# Patient Record
Sex: MALE | Race: WHITE | NOT HISPANIC OR LATINO | Employment: OTHER | URBAN - METROPOLITAN AREA
[De-identification: names, ages, dates, MRNs, and addresses within clinical notes are randomized per-mention and may not be internally consistent; named-entity substitution may affect disease eponyms.]

---

## 2023-11-16 NOTE — PROGRESS NOTES
Physical Therapy  Physical Therapy Treatment    Patient Name: Sanjeev Berumen  MRN: [id number]  Today's Date: 2023  Time Calculation  Start Time: 1045  Stop Time: 1130  Time Calculation (min): 45 min    Referring Physician: Dr. Vázquez  Insurance reviewed   Visit number: 9   [place]ved [place] of visits: [place]   Authorization not required after evaluation   Medicare MMO, no auth, no copay   Medicare Certification Period: Beginnin2023 Ending: 10/ 31/ 2023   Medicare Re-Certification Period: Beginnin2023 Endin2023       Current Problem  1. Chronic right-sided low back pain               Precautions       Pain Score: 0 - No pain  Performing HEP: No      Subjective   Patient reports he got back from [place] last week. He complains of stiffness R LB with numbness B knee to feet. Patient has been doing light exercise. States he didn't use the pool in [place], due to construction.      Objective   Function: 25%  Posture: forward flexed, pronation B  Palpation: no pain to palpation over lumbar spine  Gait: short stride, decreased heel strike/toe off, ambulating with a cane    Treatment:        Nustep x8 min. L1  Passive stretching B hip flexor, piriformis, hamstring, TR  GB heel slide x2'       GB bridge x2'       SAQ R/L alt. X2'       DB#1 x2'       Hip ER supine with red TB x2'       Sit to stand x10  Standing tandem balance R/L x2 ea in // bars    Assessment:   Tight hamstrings with stretching. Good endurance with exercise. Min. Dizziness with transfer from sit to stand       Plan:   Progress land based exercises as tolerated. Patient tends to get sore the day after, so monitor symptoms.         Charges:   2 ex, 1 NME    Almaz Valdes, PT, OCS

## 2023-11-20 ENCOUNTER — TREATMENT (OUTPATIENT)
Dept: PHYSICAL THERAPY | Facility: CLINIC | Age: 73
End: 2023-11-20
Payer: MEDICARE

## 2023-11-20 DIAGNOSIS — M54.50 CHRONIC RIGHT-SIDED LOW BACK PAIN: Primary | ICD-10-CM

## 2023-11-20 DIAGNOSIS — G89.29 CHRONIC RIGHT-SIDED LOW BACK PAIN: Primary | ICD-10-CM

## 2023-11-20 PROCEDURE — 97110 THERAPEUTIC EXERCISES: CPT | Mod: GP

## 2023-11-20 PROCEDURE — 97112 NEUROMUSCULAR REEDUCATION: CPT | Mod: GP

## 2023-11-20 ASSESSMENT — PAIN SCALES - GENERAL: PAINLEVEL_OUTOF10: 0 - NO PAIN

## 2023-11-22 ENCOUNTER — TREATMENT (OUTPATIENT)
Dept: PHYSICAL THERAPY | Facility: CLINIC | Age: 73
End: 2023-11-22
Payer: MEDICARE

## 2023-11-22 DIAGNOSIS — M54.50 CHRONIC RIGHT-SIDED LOW BACK PAIN: Primary | ICD-10-CM

## 2023-11-22 DIAGNOSIS — G89.29 CHRONIC RIGHT-SIDED LOW BACK PAIN: Primary | ICD-10-CM

## 2023-11-22 PROCEDURE — 97112 NEUROMUSCULAR REEDUCATION: CPT | Mod: GP,CQ

## 2023-11-22 PROCEDURE — 97110 THERAPEUTIC EXERCISES: CPT | Mod: GP,CQ

## 2023-11-22 NOTE — PROGRESS NOTES
"Physical Therapy  Physical Therapy Treatment    Patient Name: Sanjeev Berumen  MRN: [id number]  Today's Date: 11/22/2023  Time Calculation  Start Time: 1220  Stop Time: 1305  Time Calculation (min): 45 min    Insurance:  Visit [place]: 10 of [place]  Authorization info: Not required after evaluation  Insurance Type: Medicare MMO  Cert date start: 8/14/23  Cert date end: 10/23/23  Recert date start: 9/28/23   Recert date end: 12/27/23                General:  Reason for visit: Chronic back pain  Referred by: Dr. Vázquez      Current Problem  1. Chronic right-sided low back pain                    Subjective:   Patient reports that he has no pain only numbness down both legs.  HEP Performed:  Partially    Objective:   Function: ambulating in partial trunk flexion with antalgic gait using a standard cane.    Treatments:    Recumbent stepper- 5'   DBE 2x1'   Bosu lunge alternating- 1.5'   //bars tandem stand R/L, L/R- 1.5'x2 ea.   Iso Hip ab/ad (GRTBand/Ball)- 3'   Iso Hip ext 5\"hold/rest- 3'   GB heel slide x2'       GB bridge x2'       Hip ER supine with red TB x2'       Knee-chest- R/L/B- 20\" ea x 3 sets       SS R/L HF, Pirif, Hams, TR- 2' ea.         Charges: TE 2, NME 1 (CQ Modifier)    Assessment:   Did well but seems to be having dizziness from supine to upright.  Possibly vertigo.    Plan:   Continue decreasing lumbar radicular symptoms increase rom, flexibility, mobility, strength and function as patient tolerates.    Sanjeev Mendiola, PTA  "

## 2023-11-25 NOTE — PROGRESS NOTES
Physical Therapy  Physical Therapy Treatment    Patient Name: Sanjeev Berumen  MRN: [id number]  Today's Date: 11/27/2023  Time Calculation  Start Time: 1130  Stop Time: 1215  Time Calculation (min): 45 min    Insurance:  Visit [place]: 11 of [place]  Authorization info: Not required after evaluation  Insurance Type: Medicare MMO  Cert date start: 8/14/23  Cert date end: 10/23/23  Recert date start: 9/28/23   Recert date end: 12/27/23                General:  Reason for visit: Chronic back pain  Referred by: Dr. Vázquez      Current Problem  1. Chronic right-sided low back pain                 Pain Score: 0 - No pain    Subjective:   Patient reports that he has a little more feeling in legs and numbness is slightly improved. Patient has OA R knee, so his knee is a little sore.  HEP Performed:  Partially    Objective:   Gait: amb with cane and steppage gait.    Treatments:     Nustep x5 min. L2  Passive stretching B hip flexor, piriformis, hamstring, TR  GB heel slide x2'       GB bridge x2'       Sit to stand x12       Airex march x2'       Bosu lunge alt. R/L x2'  Standing tandem balance on airex R/L x2 ea in // bars x10 sec ea  Hamstring curl 40# 2x10  Hip ABD/ADD 32.5# 2x10 ea  Leg press 70# 2x10         Charges: TE 2, NME 1   Assessment:   Patient continues to have dizziness from supine to upright.  He states this happens at home as well. Recommend patient contact MD.    Plan:   Continue PT for strength, flexibility, balance and gait    Almaz Valdes, PT

## 2023-11-27 ENCOUNTER — TREATMENT (OUTPATIENT)
Dept: PHYSICAL THERAPY | Facility: CLINIC | Age: 73
End: 2023-11-27
Payer: MEDICARE

## 2023-11-27 DIAGNOSIS — G89.29 CHRONIC RIGHT-SIDED LOW BACK PAIN: Primary | ICD-10-CM

## 2023-11-27 DIAGNOSIS — M54.50 CHRONIC RIGHT-SIDED LOW BACK PAIN: Primary | ICD-10-CM

## 2023-11-27 PROCEDURE — 97112 NEUROMUSCULAR REEDUCATION: CPT | Mod: GP

## 2023-11-27 PROCEDURE — 97110 THERAPEUTIC EXERCISES: CPT | Mod: GP

## 2023-11-27 ASSESSMENT — PAIN SCALES - GENERAL: PAINLEVEL_OUTOF10: 0 - NO PAIN

## 2023-11-29 ENCOUNTER — TREATMENT (OUTPATIENT)
Dept: PHYSICAL THERAPY | Facility: CLINIC | Age: 73
End: 2023-11-29
Payer: MEDICARE

## 2023-11-29 DIAGNOSIS — M54.50 CHRONIC RIGHT-SIDED LOW BACK PAIN: Primary | ICD-10-CM

## 2023-11-29 DIAGNOSIS — G89.29 CHRONIC RIGHT-SIDED LOW BACK PAIN: Primary | ICD-10-CM

## 2023-11-29 PROCEDURE — 97112 NEUROMUSCULAR REEDUCATION: CPT | Mod: GP,CQ

## 2023-11-29 PROCEDURE — 97110 THERAPEUTIC EXERCISES: CPT | Mod: GP,CQ

## 2023-11-29 ASSESSMENT — PAIN SCALES - GENERAL: PAINLEVEL_OUTOF10: 0 - NO PAIN

## 2023-11-29 NOTE — PROGRESS NOTES
"Physical Therapy  Physical Therapy Treatment    Patient Name: Sanjeev Breumen  MRN: [id number]  Today's Date: 11/29/2023  Time Calculation  Start Time: 1220  Stop Time: 1305  Time Calculation (min): 45 min    Insurance:  Visit [place]: 10 of [place]  Authorization info: Not required after evaluation  Insurance Type: Medicare MMO  Cert date start: 8/14/23  Cert date end: 10/23/23  Recert date start: 9/28/23   Recert date end: 12/27/23                General:  Reason for visit: Chronic back pain  Referred by: Dr. Vázquez      Current Problem  1. Chronic right-sided low back pain               Pain Score: 0 - No pain    Subjective:   Patient reports that he has no pain only numbness down both legs.  HEP Performed:  Partially    Objective:   Function: ambulating in partial trunk flexion with antalgic gait using a standard cane.    Treatments:    T5 NuStep- 5'   Bosu lunge alternating- 1.5'   //bars tandem stand R/L, L/R- 1.5'x2 ea.   Hamstring curl 40# 2x12   Leg press 70# 2x12   Hip ab/ad 32.5#/32.5# 2x12 ea.   Iso Hip ext 5\"hold/rest- 3'   GB heel slide x2'       GB bridge x2'       Hip ER supine with red TB x2'       Knee-chest- R/L/B- 20\" ea x 3 sets       SS R/L HF, Pirif, Hams, TR- 2' ea.         Charges: TE 2, NME 1 (CQ Modifier)    Assessment:   Did well but still has some vertigo issues when he goes from supine to sit.    Plan:   Continue decreasing lumbar radicular symptoms increase rom, flexibility, mobility, strength and function as patient tolerates.    Sanjeev Mendiola, PTA  "

## 2023-11-30 NOTE — PROGRESS NOTES
Physical Therapy  Physical Therapy Treatment    Patient Name: Sanjeev Berumen  MRN: [id number]  Today's Date: 12/4/2023  Time Calculation  Start Time: 1130  Stop Time: 1215  Time Calculation (min): 45 min    Insurance:  Visit [place]: 11 of [place]  Authorization info: Not required after evaluation  Insurance Type: Medicare MMO  Cert date start: 8/14/23  Cert date end: 10/23/23  Recert date start: 9/28/23   Recert date end: 12/27/23                General:  Reason for visit: Chronic back pain  Referred by: Dr. Vázquez      Current Problem  1. Chronic right-sided low back pain                      Subjective:   Patient reports that he is tired from a lot of walking yesterday when they picked their [place] tree.  HEP Performed:  Partially    Objective:   Palpable tenderness L hip flexor after having a muscle cramp.    Treatments:    NuStep- 5' L2  Passive stretching supine B hip flexor, hamstring, piriformis, TR  Theragun to L hip flexor  GB bridge x2'   Sit to stand x15  Side step red band 40'x2   // bars Bosu lunge alternating- 2'   //bars tandem stand R/L, L/R- with 2kg KB cw/ccw x10 ea   Hamstring curl 40# 2x15   Leg press 70# 2x15   Hip ab/ad 32.5#/32.5# 2x12 ea.- not today                              Charges: TE 2, NME 1   Assessment:   Good endurance with exercise session today. Patient trying to be more aware of posture with walking.    Plan:   Continue decreasing lumbar radicular symptoms increase rom, flexibility, mobility, strength and function as patient tolerates.    Almaz Valdes, PT

## 2023-12-04 ENCOUNTER — TREATMENT (OUTPATIENT)
Dept: PHYSICAL THERAPY | Facility: CLINIC | Age: 73
End: 2023-12-04
Payer: MEDICARE

## 2023-12-04 DIAGNOSIS — G89.29 CHRONIC RIGHT-SIDED LOW BACK PAIN: Primary | ICD-10-CM

## 2023-12-04 DIAGNOSIS — M54.50 CHRONIC RIGHT-SIDED LOW BACK PAIN: Primary | ICD-10-CM

## 2023-12-04 PROCEDURE — 97110 THERAPEUTIC EXERCISES: CPT | Mod: GP

## 2023-12-04 PROCEDURE — 97112 NEUROMUSCULAR REEDUCATION: CPT | Mod: GP

## 2023-12-06 ENCOUNTER — TREATMENT (OUTPATIENT)
Dept: PHYSICAL THERAPY | Facility: CLINIC | Age: 73
End: 2023-12-06
Payer: MEDICARE

## 2023-12-06 DIAGNOSIS — G89.29 CHRONIC RIGHT-SIDED LOW BACK PAIN: Primary | ICD-10-CM

## 2023-12-06 DIAGNOSIS — M54.50 CHRONIC RIGHT-SIDED LOW BACK PAIN: Primary | ICD-10-CM

## 2023-12-06 PROCEDURE — 97110 THERAPEUTIC EXERCISES: CPT | Mod: GP,CQ

## 2023-12-06 PROCEDURE — 97112 NEUROMUSCULAR REEDUCATION: CPT | Mod: GP,CQ

## 2023-12-06 ASSESSMENT — PAIN SCALES - GENERAL: PAINLEVEL_OUTOF10: 0 - NO PAIN

## 2023-12-06 NOTE — PROGRESS NOTES
"Physical Therapy  Physical Therapy Treatment    Patient Name: Sanjeev Berumen  MRN: [id number]  Today's Date: 12/6/2023  Time Calculation  Start Time: 1220  Stop Time: 1300  Time Calculation (min): 40 min    Insurance:  Visit [place]: 12 of [place]  Authorization info: Not required after evaluation  Insurance Type: Medicare MMO  Cert date start: 8/14/23  Cert date end: 10/23/23  Recert date start: 9/28/23   Recert date end: 12/27/23                General:  Reason for visit: Chronic back pain  Referred by: Dr. Vázquez      Current Problem  1. Chronic right-sided low back pain               Pain Score: 0 - No pain    Subjective:   Patient reports that he has no pain only numbness down both legs.  HEP Performed:  Partially    Objective:   Function: ambulating in partial trunk flexion with antalgic gait using a standard cane.    Treatments:    T5 NuStep- 5'   Bosu lunge alternating- 1.5'   //bars 2kg tandem stand R/L, L/R- cw/ccw- 1' ea dir.   Side step RTBand 40'x2   Hamstring curl 35# 2x15   Leg press 75# 2x10   Hip ab/ad 32.5#/32.5# 2x15 ea.   Sit-stand 2x10   Iso Hip ext 5\"hold/rest- 3'       Hip ER supine with red TB x2'       Knee-chest- R/L/B- 20\" ea x 3 sets (omitted 12/6/23)       SS R/L HF, Pirif, Hams, TR- 2' ea. (Omitted 12/6/23)         Charges: TE 2, NME 1 (CQ Modifier)    Assessment:   Continue having vertigo when he goes from supine to sit.  Otherwise working hard to get back to a functional level of activity.    Plan:   Continue decreasing lumbar radicular symptoms increase rom, flexibility, mobility, strength and function as patient tolerates.    Sanjeev Mendiola, PTA  "

## 2023-12-12 ENCOUNTER — TREATMENT (OUTPATIENT)
Dept: PHYSICAL THERAPY | Facility: CLINIC | Age: 73
End: 2023-12-12
Payer: MEDICARE

## 2023-12-12 DIAGNOSIS — G89.29 CHRONIC RIGHT-SIDED LOW BACK PAIN: Primary | ICD-10-CM

## 2023-12-12 DIAGNOSIS — M54.50 CHRONIC RIGHT-SIDED LOW BACK PAIN: Primary | ICD-10-CM

## 2023-12-12 PROCEDURE — 97112 NEUROMUSCULAR REEDUCATION: CPT | Mod: GP,CQ

## 2023-12-12 PROCEDURE — 97110 THERAPEUTIC EXERCISES: CPT | Mod: GP,CQ

## 2023-12-12 ASSESSMENT — PAIN SCALES - GENERAL: PAINLEVEL_OUTOF10: 3

## 2023-12-12 NOTE — PROGRESS NOTES
"Physical Therapy  Physical Therapy Treatment    Patient Name: Sanjeev Berumen  MRN: [id number]  Today's Date: 12/12/2023  Time Calculation  Start Time: 1215  Stop Time: 1300  Time Calculation (min): 45 min    Insurance:  [place]: [place] of [place]  Authorization info: Not required after evaluation  Insurance Type: Medicare MMO  Cert date start: 8/14/23  Cert date end: 10/23/23  Recert date start: 9/28/23   Recert date end: 12/27/23                General:  Reason for visit: Chronic back pain  Referred by: Dr. Vázquez      Current Problem  1. Chronic right-sided low back pain               Pain Score: 3    Subjective:   Patient states that he has some mild soreness from the last visit.  Just enough to let him know that his back still bothers him.    HEP Performed:  Partially    Objective:   Function: still ambulating in partial trunk flexion using a standard cane.    Treatments:    T5 NuStep- 5'   Bosu lunge alternating- 1.5'   //bars 2kg tandem stand R/L, L/R- cw/ccw- 1' ea dir.   Side step RTBand 40'x2   Hamstring curl 35# 2x15   Leg press 75# 2x10   Hip ab/ad 32.5#/32.5# 2x15 ea.   Sit-stand 2x10   Iso Hip ext 5\"hold/rest- 3'       Knee-chest- R/L/B- 20\" ea x 3 sets        SS R/L HF, Pirif, Hams, TR- 2' ea.          Charges: TE 2, NME 1 (CQ Modifier)    Assessment:   Did a lot better today.  Had him do the static stretches in stand for the Q/HF and sitting for the piriformis.  No vertigo.    Plan:   Continue decreasing lumbar radicular symptoms increase rom, flexibility, mobility, strength and function as patient tolerates.    Sanjeev Mendiola, PTA  "

## 2023-12-14 ENCOUNTER — TREATMENT (OUTPATIENT)
Dept: PHYSICAL THERAPY | Facility: CLINIC | Age: 73
End: 2023-12-14
Payer: MEDICARE

## 2023-12-14 DIAGNOSIS — G89.29 CHRONIC RIGHT-SIDED LOW BACK PAIN: Primary | ICD-10-CM

## 2023-12-14 DIAGNOSIS — M54.50 CHRONIC RIGHT-SIDED LOW BACK PAIN: Primary | ICD-10-CM

## 2023-12-14 PROCEDURE — 97112 NEUROMUSCULAR REEDUCATION: CPT | Mod: GP,CQ

## 2023-12-14 PROCEDURE — 97110 THERAPEUTIC EXERCISES: CPT | Mod: GP,CQ

## 2023-12-14 NOTE — PROGRESS NOTES
"Physical Therapy  Physical Therapy Treatment    Patient Name: Sanjeev Berumen  MRN: [id number]  Today's Date: 12/14/2023  Time Calculation  Start Time: 1220  Stop Time: 1300  Time Calculation (min): 40 min    Insurance:  [place]: [place] of [place]  Authorization info: Not required after evaluation  Insurance Type: Medicare MMO  Cert date start: 8/14/23  Cert date end: 10/23/23  Recert date start: 9/28/23   Recert date end: 12/27/23                General:  Reason for visit: Chronic back pain  Referred by: Dr. Vázquez      Current Problem  1. Chronic right-sided low back pain                    Subjective:   Patient states that he is doing alright and that his numbness must be getting better because he had some mild knee pain.    HEP Performed:  Partially    Objective:   Function: still ambulating in partial trunk flexion using a standard cane.    Treatments:    T5 NuStep- 5'   Bosu lunge alternating- 1.5'   //bars 2kg tandem stand R/L, L/R- cw/ccw- 1' ea dir.   Side step RTBand 40'x2   Hamstring curl 35# 2x16   Leg press 75# 2x12   Hip ab/ad 32.5#/32.5# 2x16 ea.   Sit-stand 2x12   Iso Hip ext 5\"hold/rest- 3'       Knee-chest- R/L/B- 20\" ea x 3 sets        SS R/L HF, Pirif, Hams, TR- 2' ea.          Charges: TE 2, NME 1 (CQ Modifier)    Assessment:   Needs to do his stretches in stand or sit verses supine to avoid getting vertigo.    Plan:   Continue decreasing lumbar radicular symptoms increase rom, flexibility, mobility, strength and function as patient tolerates.    Sanjeev Mendiola, PTA  "

## 2023-12-18 NOTE — PROGRESS NOTES
"Physical Therapy  Physical Therapy Treatment    Patient Name: Sanjeev Berumen  MRN: [id number]  Today's Date: 12/19/2023  Time Calculation  Start Time: 1105  Stop Time: 1150  Time Calculation (min): 45 min    Insurance:  [place]: [place] of [place]  Authorization info: Not required after evaluation  Insurance Type: Medicare MMO  Cert date start: 8/14/23  Cert date end: 10/23/23  Recert date start: 9/28/23   Recert date end: 12/27/23                General:  Reason for visit: Chronic back pain  Referred by: Dr. Vázquez      Current Problem  1. Chronic right-sided low back pain                 Pain Score: 3    Subjective:   Patient states low back and R knee pain today. States numbness is now below his knee, so he can feel the pain.  HEP Performed:  Partially    Objective:   Gait: ambulates slowly with cane. Decreased heel strike and toe off.   Pulse ox: 97%, Heart rate: 69    Treatments:    T5 NuStep- 5'  Standing hamstring stretch, hip flexor stretch, quad stretch(with assist), seated piriformis stretch  Airex march x2'   Bosu lunge alternating- 2'   //bars hurdles: 2 feet, alt. Feet, side step x3 ea with hand hold of bar   Hamstring curl 35# 3x10   Leg press 75# 2x10  Deferred:   Hip ab/ad 32.5#/32.5# 2x16 ea.   Sit-stand 2x12   Iso Hip ext 5\"hold/rest- 3'       Knee-chest- R/L/B- 20\" ea x 3 sets               Charges: TE 2, NME 1     Assessment:   Performed stretched in standing today due to vertigo. Patient very SOB with cris exercises and required increased rest in sitting, however, pulse ox and heart rate were WNL.      Plan:   Patient scheduled for 2 more sessions prior to return to [place] for the winter.    Almaz Valdes, PT  "

## 2023-12-19 ENCOUNTER — TREATMENT (OUTPATIENT)
Dept: PHYSICAL THERAPY | Facility: CLINIC | Age: 73
End: 2023-12-19
Payer: MEDICARE

## 2023-12-19 DIAGNOSIS — M54.50 CHRONIC RIGHT-SIDED LOW BACK PAIN: Primary | ICD-10-CM

## 2023-12-19 DIAGNOSIS — G89.29 CHRONIC RIGHT-SIDED LOW BACK PAIN: Primary | ICD-10-CM

## 2023-12-19 PROCEDURE — 97112 NEUROMUSCULAR REEDUCATION: CPT | Mod: GP

## 2023-12-19 PROCEDURE — 97110 THERAPEUTIC EXERCISES: CPT | Mod: GP

## 2023-12-19 ASSESSMENT — PAIN SCALES - GENERAL: PAINLEVEL_OUTOF10: 3

## 2023-12-21 ENCOUNTER — TREATMENT (OUTPATIENT)
Dept: PHYSICAL THERAPY | Facility: CLINIC | Age: 73
End: 2023-12-21
Payer: MEDICARE

## 2023-12-21 DIAGNOSIS — G89.29 CHRONIC RIGHT-SIDED LOW BACK PAIN: Primary | ICD-10-CM

## 2023-12-21 DIAGNOSIS — M54.50 CHRONIC RIGHT-SIDED LOW BACK PAIN: Primary | ICD-10-CM

## 2023-12-21 PROCEDURE — 97110 THERAPEUTIC EXERCISES: CPT | Mod: GP,CQ

## 2023-12-21 PROCEDURE — 97112 NEUROMUSCULAR REEDUCATION: CPT | Mod: GP,CQ

## 2023-12-21 ASSESSMENT — PAIN - FUNCTIONAL ASSESSMENT: PAIN_FUNCTIONAL_ASSESSMENT: 0-10

## 2023-12-21 ASSESSMENT — PAIN SCALES - GENERAL: PAINLEVEL_OUTOF10: 3

## 2023-12-21 NOTE — PROGRESS NOTES
"Physical Therapy  Physical Therapy Treatment    Patient Name: Sanjeev Berumen  MRN: [id number]  Today's Date: 12/21/2023  Time Calculation  Start Time: 1220    Insurance:  [place]: [place] of [place]  Authorization info: Not required after evaluation  Insurance Type: Medicare MMO  Cert date start: 8/14/23  Cert date end: 10/23/23  Recert date start: 9/28/23   Recert date end: 12/27/23                General:  Reason for visit: Chronic back pain  Referred by: Dr. Vázquez      Current Problem  1. Chronic right-sided low back pain               Pain Assessment: 0-10  Pain Score: 3    Subjective:   Patient states that his right knee pain is a 4 and his back about a 2-3.    HEP Performed:  Partially    Objective:   Function: still ambulating in partial trunk flexion using a standard cane.    Treatments:    T5 NuStep- 5'  DBE 2x1.5'   Bosu lunge alternating- 1.5'   //bars 2kg tandem stand R/L, L/R- cw/ccw- 1' ea dir.   Side step RTBand 40'x2   Hamstring curl 40# 2x16   Leg press 75# 2x15   Hip ab/ad 32.5#/32.5# 2x16 ea.   Sit-stand 2x12   Iso Hip ext 5\"hold/rest- 3'           SS R/L HF, Pirif, Hams, TR- 2' ea.          Charges: TE 2, NME 1 (CQ Modifier)    Assessment:   Continue to do his stretching in sit vs supine to avoid vertigo.    Plan:   Continue decreasing lumbar radicular symptoms increase rom, flexibility, mobility, strength and function as patient tolerates.    Sanjeev Mendiola, PTA  "
4 = No assist / stand by assistance

## 2023-12-22 NOTE — PROGRESS NOTES
Physical Therapy  Physical Therapy Orthopedic Progress Note    Patient Name: Sanjeev Berumen  MRN: [id number]  Today's Date: 12/26/2023  Time Calculation  Start Time: 1100  Stop Time: 1145  Time Calculation (min): 45 min    Referring Physician: Dr. Vázquez   [place]: 17 of [place]  Authorization info: Not required after evaluation  Insurance Type: Medicare MMO  Cert date start: 8/14/23  Cert date end: 10/23/23  Recert date start: 9/28/23   Recert date end: 12/27/23             Current Problem  1. Chronic right-sided low back pain               Precautions:           Subjective   Patient reports he fell on 12/24/23. He had to use a chair to help himself up. He did not need to go to the ER. Complains of pain R lower lumbar spine today. Also states he feels more unbalanced lately.    Current Condition:   same     PAIN  Pain Score: 5 - Moderate pain  Description: Occasional shooting pain R low back, otherwise dull and achy pain.    Self Reported Function (0-100%) = 30%, no change      Objective     Pain: 5/10  Oswestry: 42% from 24% on eval  Posture: forward flexed, pronation B  Palpation: no pain to palpation over lumbar spine  Gait: short stride, decreased heel strike/toe off  Balance: tandem 15 sec R/L with min. difficulty  L-AROM: decreased 50% FB, SB, rot. Sharp pain R LB with movement  LE Strength: R hip flexor 3+, Q/H 4, DF 5  Core Strength: 3+  LE flexibility: mod. tight hamstring B, min.tight hip flexor B, min. tight piriformis L  Lumbar spinal mobility: hypomobile lumbar spine.                  Treatments:    T5 NuStep- 5'  Re-assess goals  seated piriformis stretch   Supine(with 2 pillows) Passive stretching B hip flexor, hamstring, piriformis, TR  GS x15  SAQ x15 R/L  Hip ADD iso with ball x15  Pelvic tilt x15  STM R LB in sidelying  Swisswing x2' R LB    Assessment:   Based on re-evaluation, patient has not made improvements in PT. Some of this could be due to recent fall and increase in R LBP. Patient has  a follow up appt. with his back surgeon 1/4/24.     Goals: Updated 12/26/2023  Resolved       PT Problem       STG (Not met)       Start:  09/28/23    Expected End:  12/27/23    Resolved:  12/26/23    Decrease Oswestry by 6 points to help improve ADL's - in 4 wks- goal not achieved  Increase strength 1/2 muscle grade core/LE - in 4 wks- goal not achieved  Patient to demonstrate proper gait pattern - in 4 wks- in progress  Patient able to walk 1/2 mile without symptoms - in 4 wks- in progress         LTG (Not met)       Start:  09/28/23    Expected End:  12/27/23    Resolved:  12/26/23    Increase L-AROM 25% - in 8 wks- goal not met  Improve Flexibility hamstring to decrease pull on lumbar spine - in 8 wks - goal not achieved  Educate in aquatic exercise - met  Patient to be compliant with daily HEP - in 8 wks- goal partially met               Plan of Care: Updated 12/26/2023   Discharge PT, as patient is returning to [place] 1/8/24. Recommend patient continue with aquatic rehab and possibly formal PT in [place].    Charges: 2 ex, 1 NME    Almaz Valdes, PT, OCS

## 2023-12-26 ENCOUNTER — TREATMENT (OUTPATIENT)
Dept: PHYSICAL THERAPY | Facility: CLINIC | Age: 73
End: 2023-12-26
Payer: MEDICARE

## 2023-12-26 DIAGNOSIS — G89.29 CHRONIC RIGHT-SIDED LOW BACK PAIN: Primary | ICD-10-CM

## 2023-12-26 DIAGNOSIS — M54.50 CHRONIC RIGHT-SIDED LOW BACK PAIN: Primary | ICD-10-CM

## 2023-12-26 PROCEDURE — 97112 NEUROMUSCULAR REEDUCATION: CPT | Mod: GP

## 2023-12-26 PROCEDURE — 97110 THERAPEUTIC EXERCISES: CPT | Mod: GP

## 2023-12-26 ASSESSMENT — PAIN SCALES - GENERAL: PAINLEVEL_OUTOF10: 5 - MODERATE PAIN

## 2025-04-21 ENCOUNTER — APPOINTMENT (OUTPATIENT)
Dept: ORTHOPEDIC SURGERY | Facility: CLINIC | Age: 75
End: 2025-04-21
Payer: MEDICARE

## 2025-04-21 ENCOUNTER — HOSPITAL ENCOUNTER (OUTPATIENT)
Dept: RADIOLOGY | Facility: CLINIC | Age: 75
Discharge: HOME | End: 2025-04-21
Payer: MEDICARE

## 2025-04-21 VITALS — WEIGHT: 200 LBS | BODY MASS INDEX: 27.09 KG/M2 | HEIGHT: 72 IN

## 2025-04-21 DIAGNOSIS — M25.561 RIGHT KNEE PAIN, UNSPECIFIED CHRONICITY: ICD-10-CM

## 2025-04-21 DIAGNOSIS — M17.11 PRIMARY OSTEOARTHRITIS OF RIGHT KNEE: Primary | ICD-10-CM

## 2025-04-21 PROCEDURE — 73562 X-RAY EXAM OF KNEE 3: CPT | Mod: RT

## 2025-04-21 PROCEDURE — 73562 X-RAY EXAM OF KNEE 3: CPT | Mod: RIGHT SIDE | Performed by: RADIOLOGY

## 2025-04-21 PROCEDURE — 20610 DRAIN/INJ JOINT/BURSA W/O US: CPT | Performed by: ORTHOPAEDIC SURGERY

## 2025-04-21 PROCEDURE — 99203 OFFICE O/P NEW LOW 30 MIN: CPT | Performed by: ORTHOPAEDIC SURGERY

## 2025-04-21 PROCEDURE — 1159F MED LIST DOCD IN RCRD: CPT | Performed by: ORTHOPAEDIC SURGERY

## 2025-04-21 PROCEDURE — 1036F TOBACCO NON-USER: CPT | Performed by: ORTHOPAEDIC SURGERY

## 2025-04-21 PROCEDURE — 1157F ADVNC CARE PLAN IN RCRD: CPT | Performed by: ORTHOPAEDIC SURGERY

## 2025-04-21 PROCEDURE — 1160F RVW MEDS BY RX/DR IN RCRD: CPT | Performed by: ORTHOPAEDIC SURGERY

## 2025-04-21 RX ORDER — METOPROLOL SUCCINATE 50 MG/1
1 TABLET, EXTENDED RELEASE ORAL DAILY
COMMUNITY

## 2025-04-21 RX ORDER — AMLODIPINE BESYLATE 5 MG/1
5 TABLET ORAL
COMMUNITY

## 2025-04-21 RX ORDER — SOLIFENACIN SUCCINATE 10 MG/1
10 TABLET, FILM COATED ORAL
COMMUNITY
Start: 2024-07-02

## 2025-04-21 RX ORDER — METHYLPREDNISOLONE ACETATE 40 MG/ML
40 INJECTION, SUSPENSION INTRA-ARTICULAR; INTRALESIONAL; INTRAMUSCULAR; SOFT TISSUE
Status: COMPLETED | OUTPATIENT
Start: 2025-04-21 | End: 2025-04-21

## 2025-04-21 RX ORDER — LIDOCAINE HYDROCHLORIDE 20 MG/ML
2 INJECTION, SOLUTION INFILTRATION; PERINEURAL
Status: COMPLETED | OUTPATIENT
Start: 2025-04-21 | End: 2025-04-21

## 2025-04-21 RX ADMIN — METHYLPREDNISOLONE ACETATE 40 MG: 40 INJECTION, SUSPENSION INTRA-ARTICULAR; INTRALESIONAL; INTRAMUSCULAR; SOFT TISSUE at 13:59

## 2025-04-21 RX ADMIN — LIDOCAINE HYDROCHLORIDE 2 ML: 20 INJECTION, SOLUTION INFILTRATION; PERINEURAL at 13:59

## 2025-04-21 NOTE — PROGRESS NOTES
Chief Complaint   Chief Complaint   Patient presents with    Right Knee - Pain         HPI:      Sanjeev Berumen is a pleasant 74 y.o. year-old male who is seen today for right knee pain he is status post successful left total knee arthroplasty he has had injections in his knee before with minimal relief but he has been through a series of spine operations he is gradually recovering his feeling in his lower extremities he is not diabetic he is not on blood thinners    Review of Systems all other body systems have been reviewed and are negative for complaint.    There were no vitals filed for this visit.    Medical History[1]  Problem List[2]    Medication Documentation Review Audit       Reviewed by Michael Barrientos MA (Medical Assistant) on 04/21/25 at 1345      Medication Order Taking? Sig Documenting Provider Last Dose Status   amLODIPine (Norvasc) 5 mg tablet [id number]  Take 1 tablet (5 mg) by mouth once daily. Historical Provider, MD  Active   metoprolol succinate XL (Toprol-XL) 50 mg 24 hr tablet [id number]  Take 1 tablet (50 mg) by mouth once daily. Historical Provider, MD  Active   solifenacin (VESIcare) 10 mg tablet [id number] Yes Take 1 tablet (10 mg) by mouth once daily. Historical Provider, MD  Active                    RX Allergies[3]    Social History     Socioeconomic History    Marital status:      Spouse name: Not on file    Number of children: Not on file    Years of education: Not on file    Highest education level: Not on file   Occupational History    Not on file   Tobacco Use    Smoking status: Former     Types: Cigarettes    Smokeless tobacco: Never   Substance and Sexual Activity    Alcohol use: Not on file    Drug use: Not on file    Sexual activity: Not on file   Other Topics Concern    Not on file   Social History Narrative    Not on file     Social Drivers of Health     Financial Resource Strain: Not on file   Food Insecurity: Not on file   Transportation Needs: Not on file    Physical Activity: Not on file   Stress: Not on file   Social Connections: Not on file   Intimate Partner Violence: Not on file   Housing Stability: Not on file       Surgical History[4]    Body mass index is 27.12 kg/m².    HgA1c:   Lab Results   Component Value Date    HGBA1C 5.2 07/11/2022    [id number] 103 07/11/2022       Physical Exam:  Constitutional: Well-developed well-nourished   Eyes: Sclerae anicteric, pupils equal and round  HENT: Normocephalic atraumatic  Cardiovascular: Pulses full, regular rate and rhythm  Respiratory: Breathing not labored, no wheezing  Integumentary: Skin intact, no lesions or rashes  Neurological: Sensation intact, no gross strength deficits, reflexes equal  Psychiatric: Alert oriented and appropriate  Hematologic/lymphatic: No lymphadenopathy  Right knee: Significant varus deformity maximally tender medially small effusion range of motion tender 120 degrees no gross instability          Imaging:  KL stage IV arthritis of the right knee        Impression/Plan:  End-stage knee arthritis  Injected knee today discussed indications for knee replacement surgery he will consider if this is not effective  L Inj/Asp: R knee on 4/21/2025 1:59 PM  Indications: pain  Details: 21 G needle, anteromedial approach  Medications: 40 mg methylPREDNISolone acetate 40 mg/mL; 2 mL lidocaine 20 mg/mL (2 %)               [1]   Past Medical History:  Diagnosis Date    Personal history of other diseases of the musculoskeletal system and connective tissue     History of arthritis   [2]   Patient Active Problem List  Diagnosis    Chronic right-sided low back pain   [3] No Known Allergies  [4]   Past Surgical History:  Procedure Laterality Date    OTHER SURGICAL HISTORY  09/16/2020    Shoulder replacement    OTHER SURGICAL HISTORY  06/28/2021    Laminectomy

## 2025-07-08 ENCOUNTER — TELEPHONE (OUTPATIENT)
Dept: ORTHOPEDIC SURGERY | Facility: CLINIC | Age: 75
End: 2025-07-08
Payer: MEDICARE